# Patient Record
Sex: FEMALE | URBAN - METROPOLITAN AREA
[De-identification: names, ages, dates, MRNs, and addresses within clinical notes are randomized per-mention and may not be internally consistent; named-entity substitution may affect disease eponyms.]

---

## 2018-11-28 ENCOUNTER — APPOINTMENT (RX ONLY)
Dept: URBAN - METROPOLITAN AREA CLINIC 152 | Facility: CLINIC | Age: 63
Setting detail: DERMATOLOGY
End: 2018-11-28

## 2018-11-28 DIAGNOSIS — L259 CONTACT DERMATITIS AND OTHER ECZEMA, UNSPECIFIED CAUSE: ICD-10-CM

## 2018-11-28 PROBLEM — L30.9 DERMATITIS, UNSPECIFIED: Status: ACTIVE | Noted: 2018-11-28

## 2018-11-28 PROCEDURE — 99202 OFFICE O/P NEW SF 15 MIN: CPT

## 2018-11-28 PROCEDURE — ? COUNSELING

## 2018-11-28 PROCEDURE — ? PRESCRIPTION

## 2018-11-28 PROCEDURE — ? DIAGNOSIS COMMENT

## 2018-11-28 RX ORDER — TRIAMCINOLONE ACETONIDE 1 MG/G
OINTMENT TOPICAL
Qty: 1 | Refills: 2 | Status: ERX | COMMUNITY
Start: 2018-11-28

## 2018-11-28 RX ADMIN — TRIAMCINOLONE ACETONIDE: 1 OINTMENT TOPICAL at 19:28

## 2018-11-28 NOTE — PROCEDURE: COUNSELING
Patient Specific Counseling (Will Not Stick From Patient To Patient): She will use the TAC 0.1% ointment QHS on her ears for 2 weeks and return to clinic for a follow up visit to assess her condition.
Detail Level: Zone

## 2018-11-28 NOTE — HPI: RASH
Is This A New Presentation, Or A Follow-Up?: Rash
Additional History: She was in the hospital for “cellulitis/dermatitis” on her nose and ears, which started out in the auricle of left ear. She was given TAC 0.1% cream which has helped out significantly, but she still presents with itching and flaking. She notes that she she experienced discharge from lesions prior to using TAC. Culture positive for cellulitis.

## 2018-11-28 NOTE — PROCEDURE: DIAGNOSIS COMMENT
Comment: I feel that the changes on her face and ear are most consistent with an allergic contact dermatitis.  She is doing much better.  Currently I do not see any evidence of a cellulitis.
Detail Level: Zone